# Patient Record
(demographics unavailable — no encounter records)

---

## 2025-07-29 NOTE — ASSESSMENT
[FreeTextEntry1] : 85 yo female with h/o HTN, HL, ectopy, HFpEF, obesity and ALETA amongst other medical problems who is here for cardiac eval after recent hospitalization. 1. Chronic HFpEF. Volume status difficult to assess due to body habitus, but appears generally euvolemic today. BP is acceptable. Continue metoprolol and torsemide. Could consider spironolactone in the future for HFpEF based on TOPCAT trial. Also on Farxiga. Would avoid salt tabs. F/u echo. 2. HTN. Cont metoprolol. 3. HL. Cont statin therapy. 4. ALETA. Likely causing fatigue. Recommend to follow up with Pulmonary for alternative mask options or eval for Inspire Device. 5. OHS. Follow up with pulmonary. 6. Obesity. Recommend diet and weight loss. 7. Arthritis. 8. Fatigue. Likely multifactorial related to deconditioning, obesity, and ALETA. Will also check A1C to look for DM given comorbidities. 9. Vertigo. Consider treatment prn if chronic. 10. Venous insufficiency. Recommend conservative treatment with keeping legs raised, avoidance of salt, and compression stockings.

## 2025-07-29 NOTE — PHYSICAL EXAM
[Well Developed] : well developed [Well Nourished] : well nourished [No Acute Distress] : no acute distress [Obese] : obese [Normal Conjunctiva] : normal conjunctiva [Normal Venous Pressure] : normal venous pressure [No Carotid Bruit] : no carotid bruit [Normal S1, S2] : normal S1, S2 [No Murmur] : no murmur [No Rub] : no rub [No Gallop] : no gallop [Clear Lung Fields] : clear lung fields [No Respiratory Distress] : no respiratory distress  [Soft] : abdomen soft [Non Tender] : non-tender [Alert and Oriented] : alert and oriented [de-identified] : + venous stasis; no pitting edema noted

## 2025-07-29 NOTE — HISTORY OF PRESENT ILLNESS
[FreeTextEntry1] : Patient is an 87 yo [place]-speaking female from [facility] here for follow up of recent hospitalization. She has a history of HTN, HL, HFpEF, PACs/PVCs, obesity-hypoventilation syndrome, ALETA not on CPAP, venous insufficiency, arthritis, and obesity amongst other medical problems. She was in the hospital in May at [facility] with respiratory failure from combination of a viral infection, OHS, ALETA and HFpEF. EF was again normal. She goes home to [facility] in the winter. She has doctors there and also outside [facility]. Seems like there may have been some discrepancy with medication doses of Entresto between the countries.  In any event, her BP was lower in the hospital and given the normal EF, Entresto was not felt to be necessary so it was discontinued. Her metoprolol has also been changed to 25 mg, I suspect from bradycardia based on the patient and family member's report.  She denies CP or SOB. She gets LH from time to time which she attributes to low blood pressure, but it isn't always low it seems. The lowest she remembers is 100s/70s.  When it's low she takes salt under the tongue or hot coffee. She is otherwise compliant with her medical regimen.  She gets swelling in the legs from time to time. It gets better in the AM and worse as the day progresses. She doesn't smoke, drink or do drugs.